# Patient Record
Sex: FEMALE | Race: WHITE | URBAN - METROPOLITAN AREA
[De-identification: names, ages, dates, MRNs, and addresses within clinical notes are randomized per-mention and may not be internally consistent; named-entity substitution may affect disease eponyms.]

---

## 2020-10-08 ENCOUNTER — OFFICE VISIT (OUTPATIENT)
Dept: URBAN - METROPOLITAN AREA CLINIC 50 | Facility: CLINIC | Age: 45
End: 2020-10-08

## 2020-10-26 ENCOUNTER — TELEPHONE ENCOUNTER (OUTPATIENT)
Dept: URBAN - METROPOLITAN AREA CLINIC 96 | Facility: CLINIC | Age: 45
End: 2020-10-26

## 2020-12-03 ENCOUNTER — OFFICE VISIT (OUTPATIENT)
Dept: URBAN - METROPOLITAN AREA CLINIC 50 | Facility: CLINIC | Age: 45
End: 2020-12-03
Payer: COMMERCIAL

## 2020-12-03 VITALS
HEART RATE: 60 BPM | WEIGHT: 145.8 LBS | HEIGHT: 59 IN | SYSTOLIC BLOOD PRESSURE: 113 MMHG | DIASTOLIC BLOOD PRESSURE: 86 MMHG | BODY MASS INDEX: 29.39 KG/M2 | TEMPERATURE: 98.1 F

## 2020-12-03 DIAGNOSIS — K44.9 HIATAL HERNIA: ICD-10-CM

## 2020-12-03 DIAGNOSIS — K80.20 CHOLELITHIASES: ICD-10-CM

## 2020-12-03 DIAGNOSIS — K22.10 EROSIVE ESOPHAGITIS: ICD-10-CM

## 2020-12-03 DIAGNOSIS — K22.70 BARRETT ESOPHAGUS: ICD-10-CM

## 2020-12-03 DIAGNOSIS — E66.3 OVERWEIGHT: ICD-10-CM

## 2020-12-03 PROBLEM — 414916001 OBESITY: Status: ACTIVE | Noted: 2020-12-03

## 2020-12-03 PROCEDURE — 99213 OFFICE O/P EST LOW 20 MIN: CPT | Performed by: INTERNAL MEDICINE

## 2020-12-03 RX ORDER — PANTOPRAZOLE SODIUM 20 MG/1
1 TABLET TABLET, DELAYED RELEASE ORAL ONCE A DAY
Qty: 90 TABLET | Refills: 3 | OUTPATIENT

## 2020-12-03 NOTE — HPI-TODAY'S VISIT:
45 y.o. WF Doing ok - no N/V, dysphagia Dealing w/ sinus infxn on therapy now Pantoprazole is better than omeprazole If skips a dose, will take a few days to get back Otherwise, good Had labs w/ GYN - all looked good except vit D

## 2021-01-13 ENCOUNTER — TELEPHONE ENCOUNTER (OUTPATIENT)
Dept: URBAN - METROPOLITAN AREA CLINIC 92 | Facility: CLINIC | Age: 46
End: 2021-01-13

## 2021-01-15 ENCOUNTER — OFFICE VISIT (OUTPATIENT)
Dept: URBAN - METROPOLITAN AREA TELEHEALTH 2 | Facility: TELEHEALTH | Age: 46
End: 2021-01-15
Payer: COMMERCIAL

## 2021-01-15 ENCOUNTER — LAB OUTSIDE AN ENCOUNTER (OUTPATIENT)
Dept: URBAN - METROPOLITAN AREA TELEHEALTH 2 | Facility: TELEHEALTH | Age: 46
End: 2021-01-15

## 2021-01-15 DIAGNOSIS — R10.13 MIDEPIGASTRIC PAIN: ICD-10-CM

## 2021-01-15 DIAGNOSIS — R10.11 RUQ PAIN: ICD-10-CM

## 2021-01-15 DIAGNOSIS — K62.5 RECTAL BLEEDING: ICD-10-CM

## 2021-01-15 PROCEDURE — G8427 DOCREV CUR MEDS BY ELIG CLIN: HCPCS | Performed by: INTERNAL MEDICINE

## 2021-01-15 PROCEDURE — G8482 FLU IMMUNIZE ORDER/ADMIN: HCPCS | Performed by: INTERNAL MEDICINE

## 2021-01-15 PROCEDURE — G8420 CALC BMI NORM PARAMETERS: HCPCS | Performed by: INTERNAL MEDICINE

## 2021-01-15 PROCEDURE — 99214 OFFICE O/P EST MOD 30 MIN: CPT | Performed by: INTERNAL MEDICINE

## 2021-01-15 PROCEDURE — 4004F PT TOBACCO SCREEN RCVD TLK: CPT | Performed by: INTERNAL MEDICINE

## 2021-01-15 PROCEDURE — G9906 PT RECV TBCO CESS INTERV: HCPCS | Performed by: INTERNAL MEDICINE

## 2021-01-15 PROCEDURE — G9902 PT SCRN TBCO AND ID AS USER: HCPCS | Performed by: INTERNAL MEDICINE

## 2021-01-15 RX ORDER — PANTOPRAZOLE SODIUM 20 MG/1
1 TABLET TABLET, DELAYED RELEASE ORAL
Qty: 98 | Refills: 0 | OUTPATIENT
Start: 2021-01-15

## 2021-01-15 RX ORDER — PANTOPRAZOLE SODIUM 20 MG/1
1 TABLET TABLET, DELAYED RELEASE ORAL ONCE A DAY
Qty: 90 TABLET | Refills: 3 | Status: ACTIVE | COMMUNITY

## 2021-01-15 RX ORDER — ACETAMINOPHEN 500 MG
AS DIRECTED TABLET ORAL
Status: ACTIVE | COMMUNITY

## 2021-01-15 NOTE — PHYSICAL EXAM HENT:
Head , normocephalic , atraumatic , Face , Face within normal limits , Ears , External ears within normal limits , Nose/Nasopharynx , External nose  normal appearance , Mouth and Throat , Oral cavity appearance normal Female

## 2021-01-15 NOTE — HPI-TODAY'S VISIT:
Patient has a hx of GERD for a long time She has been having some symptoms returning over the past week and a half - has not had them for about 7 years Slight pain in midepigastric area - comes and goes - some days no pain and somedays an awareness of pain - can feel it at times in mid to lower back but does not know if the cause - the pain is more in the RUQ than mid and left She has gallstones found on imaging No nausea or emesis She can see some bloating and feel some bloating above her umbilicus  Does not know if it is all from not eating as well over the holidays or not She has also seen a small amt of blood on the tissue when she wipes - she has had a few bouts of constipation and hard stools and she wonders if this is from that No rectal pain She has 1 BM a day - had a really difficult stool a few days ago and since then has some pain with the bm and some brb She has never had a colonoscopy No family hx colon cancer or polyps No unintentional weight loss She is on Pantoprazole 20mg a day Before xmas she was on 2 rounds of antibiotics and steroids for a sinus infection She started to take a probiotic

## 2021-01-18 ENCOUNTER — TELEPHONE ENCOUNTER (OUTPATIENT)
Dept: URBAN - METROPOLITAN AREA CLINIC 92 | Facility: CLINIC | Age: 46
End: 2021-01-18

## 2021-01-20 ENCOUNTER — TELEPHONE ENCOUNTER (OUTPATIENT)
Dept: URBAN - METROPOLITAN AREA CLINIC 92 | Facility: CLINIC | Age: 46
End: 2021-01-20

## 2021-01-20 ENCOUNTER — LAB OUTSIDE AN ENCOUNTER (OUTPATIENT)
Dept: URBAN - METROPOLITAN AREA CLINIC 96 | Facility: CLINIC | Age: 46
End: 2021-01-20

## 2021-02-04 ENCOUNTER — OFFICE VISIT (OUTPATIENT)
Dept: URBAN - METROPOLITAN AREA CLINIC 50 | Facility: CLINIC | Age: 46
End: 2021-02-04
Payer: COMMERCIAL

## 2021-02-04 VITALS
SYSTOLIC BLOOD PRESSURE: 117 MMHG | DIASTOLIC BLOOD PRESSURE: 77 MMHG | HEIGHT: 60 IN | WEIGHT: 138 LBS | BODY MASS INDEX: 27.09 KG/M2 | TEMPERATURE: 98.4 F | HEART RATE: 91 BPM

## 2021-02-04 DIAGNOSIS — E66.3 OVERWEIGHT: ICD-10-CM

## 2021-02-04 DIAGNOSIS — K21.9 ACID REFLUX: ICD-10-CM

## 2021-02-04 DIAGNOSIS — R10.11 RIGHT UPPER QUADRANT ABDOMINAL PAIN OF UNKNOWN ETIOLOGY: ICD-10-CM

## 2021-02-04 DIAGNOSIS — K80.20 CHOLELITHIASES: ICD-10-CM

## 2021-02-04 PROCEDURE — 99214 OFFICE O/P EST MOD 30 MIN: CPT | Performed by: INTERNAL MEDICINE

## 2021-02-04 NOTE — HPI-TODAY'S VISIT:
45 y.o. WF Pain for past month that comes and goes in RUQ Was having pain in RUQ that moves aroudn to back For past week, pain in front and back is better Now just an awareness Felt poorly with eating some food Syx always on right side Feels like syx are worse  DId 80mg of PPI for a while Sinus infxn in Nov - abx, steroids - felt poorly Lots of stress - moving, etc. Had labs w/ GYN - abn cholesterol

## 2021-05-07 ENCOUNTER — TELEPHONE ENCOUNTER (OUTPATIENT)
Dept: URBAN - METROPOLITAN AREA CLINIC 98 | Facility: CLINIC | Age: 46
End: 2021-05-07

## 2021-05-10 ENCOUNTER — WEB ENCOUNTER (OUTPATIENT)
Dept: URBAN - METROPOLITAN AREA CLINIC 50 | Facility: CLINIC | Age: 46
End: 2021-05-10

## 2021-05-11 ENCOUNTER — WEB ENCOUNTER (OUTPATIENT)
Dept: URBAN - METROPOLITAN AREA CLINIC 92 | Facility: CLINIC | Age: 46
End: 2021-05-11

## 2021-05-19 ENCOUNTER — WEB ENCOUNTER (OUTPATIENT)
Dept: URBAN - METROPOLITAN AREA CLINIC 50 | Facility: CLINIC | Age: 46
End: 2021-05-19

## 2021-07-23 ENCOUNTER — WEB ENCOUNTER (OUTPATIENT)
Dept: URBAN - METROPOLITAN AREA CLINIC 50 | Facility: CLINIC | Age: 46
End: 2021-07-23

## 2021-09-30 ENCOUNTER — DASHBOARD ENCOUNTERS (OUTPATIENT)
Age: 46
End: 2021-09-30

## 2021-10-05 ENCOUNTER — LAB OUTSIDE AN ENCOUNTER (OUTPATIENT)
Dept: URBAN - METROPOLITAN AREA TELEHEALTH 2 | Facility: TELEHEALTH | Age: 46
End: 2021-10-05

## 2021-10-05 ENCOUNTER — OFFICE VISIT (OUTPATIENT)
Dept: URBAN - METROPOLITAN AREA TELEHEALTH 2 | Facility: TELEHEALTH | Age: 46
End: 2021-10-05
Payer: COMMERCIAL

## 2021-10-05 VITALS — HEIGHT: 60 IN | WEIGHT: 140 LBS | BODY MASS INDEX: 27.48 KG/M2

## 2021-10-05 DIAGNOSIS — Z83.79 FAMILY HISTORY OF GALLBLADDER DISEASE: ICD-10-CM

## 2021-10-05 DIAGNOSIS — M54.9 BACK PAIN: ICD-10-CM

## 2021-10-05 DIAGNOSIS — K21.0 GASTRO-ESOPHAGEAL REFLUX DISEASE WITH ESOPHAGITIS: ICD-10-CM

## 2021-10-05 DIAGNOSIS — R10.11 RIGHT UPPER QUADRANT ABDOMINAL PAIN OF UNKNOWN ETIOLOGY: ICD-10-CM

## 2021-10-05 DIAGNOSIS — K80.20 CHOLELITHIASES: ICD-10-CM

## 2021-10-05 DIAGNOSIS — E66.3 OVERWEIGHT: ICD-10-CM

## 2021-10-05 PROBLEM — 266474003 CHOLELITHIASIS: Status: ACTIVE | Noted: 2020-12-03

## 2021-10-05 PROCEDURE — 99213 OFFICE O/P EST LOW 20 MIN: CPT | Performed by: PHYSICIAN ASSISTANT

## 2021-10-05 RX ORDER — PANTOPRAZOLE SODIUM 20 MG/1
1 TABLET TABLET, DELAYED RELEASE ORAL ONCE A DAY
Qty: 90 TABLET | Refills: 3 | OUTPATIENT
Start: 2021-10-05

## 2021-10-05 NOTE — HPI-TODAY'S VISIT:
She is due for an EGD this year - has hx of reflux esophagitis that what thought at first to be Ridley's but path did not show it She had some RUQ pain that was worse the beginning of the year - more of an awareness now than pain Ultrasound done, HIDA, CT - all looked good from GI standpoint She did get labs done at her PCP which showed positive for some autoimmune antibodies - Lupus and Systemic Scleroderma - hopefully has neither but working with Rheumatologist  Inflammatory markers are normal In a wait and see mode with the rheum stuff BM are normal now - no blood in her stool and no melena Does have joint pain in her hips and lower back

## 2021-12-16 ENCOUNTER — OFFICE VISIT (OUTPATIENT)
Dept: URBAN - METROPOLITAN AREA SURGERY CENTER 19 | Facility: SURGERY CENTER | Age: 46
End: 2021-12-16

## 2022-03-10 PROBLEM — 266433003 GASTRO-ESOPHAGEAL REFLUX DISEASE WITH ESOPHAGITIS: Status: ACTIVE | Noted: 2021-02-04

## 2022-03-15 ENCOUNTER — TELEPHONE ENCOUNTER (OUTPATIENT)
Dept: URBAN - METROPOLITAN AREA CLINIC 50 | Facility: CLINIC | Age: 47
End: 2022-03-15

## 2022-03-15 RX ORDER — POLYETHYLENE GLYCOL 3350, SODIUM SULFATE, SODIUM CHLORIDE, POTASSIUM CHLORIDE, ASCORBIC ACID, SODIUM ASCORBATE 140-9-5.2G
AS DIRECTED KIT ORAL 1
Qty: 1 | Refills: 0 | OUTPATIENT
Start: 2022-04-11 | End: 2022-04-12

## 2022-03-15 RX ORDER — PANTOPRAZOLE SODIUM 20 MG/1
1 TABLET TABLET, DELAYED RELEASE ORAL ONCE A DAY
Qty: 90 TABLET | Refills: 3 | Status: ACTIVE | COMMUNITY
Start: 2021-10-05

## 2022-03-17 ENCOUNTER — OFFICE VISIT (OUTPATIENT)
Dept: URBAN - METROPOLITAN AREA SURGERY CENTER 19 | Facility: SURGERY CENTER | Age: 47
End: 2022-03-17

## 2022-04-11 ENCOUNTER — LAB OUTSIDE AN ENCOUNTER (OUTPATIENT)
Dept: URBAN - METROPOLITAN AREA CLINIC 50 | Facility: CLINIC | Age: 47
End: 2022-04-11